# Patient Record
Sex: MALE | Race: OTHER | HISPANIC OR LATINO | Employment: FULL TIME | URBAN - METROPOLITAN AREA
[De-identification: names, ages, dates, MRNs, and addresses within clinical notes are randomized per-mention and may not be internally consistent; named-entity substitution may affect disease eponyms.]

---

## 2021-01-06 ENCOUNTER — HOSPITAL ENCOUNTER (EMERGENCY)
Facility: HOSPITAL | Age: 26
Discharge: HOME | End: 2021-01-06
Attending: EMERGENCY MEDICINE | Admitting: EMERGENCY MEDICINE

## 2021-01-06 VITALS
SYSTOLIC BLOOD PRESSURE: 135 MMHG | TEMPERATURE: 99 F | DIASTOLIC BLOOD PRESSURE: 75 MMHG | HEIGHT: 70 IN | OXYGEN SATURATION: 98 % | BODY MASS INDEX: 23.23 KG/M2 | RESPIRATION RATE: 18 BRPM | WEIGHT: 162.26 LBS | HEART RATE: 88 BPM

## 2021-01-06 DIAGNOSIS — H61.20 CERUMEN IMPACTION: Primary | ICD-10-CM

## 2021-01-06 DIAGNOSIS — H72.90 TYMPANIC MEMBRANE RUPTURE: ICD-10-CM

## 2021-01-06 DIAGNOSIS — R11.0 NAUSEA: ICD-10-CM

## 2021-01-06 PROCEDURE — 69210 REMOVE IMPACTED EAR WAX UNI: CPT | Performed by: PHYSICIAN ASSISTANT

## 2021-01-06 PROCEDURE — 99284 EMERGENCY DEPT VISIT MOD MDM: CPT | Performed by: PHYSICIAN ASSISTANT

## 2021-01-06 PROCEDURE — 99282 EMERGENCY DEPT VISIT SF MDM: CPT

## 2021-01-06 RX ORDER — ONDANSETRON 4 MG/1
4 TABLET, ORALLY DISINTEGRATING ORAL ONCE
Status: COMPLETED | OUTPATIENT
Start: 2021-01-06 | End: 2021-01-06

## 2021-01-06 RX ORDER — MAGNESIUM HYDROXIDE/ALUMINUM HYDROXICE/SIMETHICONE 120; 1200; 1200 MG/30ML; MG/30ML; MG/30ML
30 SUSPENSION ORAL ONCE
Status: COMPLETED | OUTPATIENT
Start: 2021-01-06 | End: 2021-01-06

## 2021-01-06 RX ADMIN — ONDANSETRON 4 MG: 4 TABLET, ORALLY DISINTEGRATING ORAL at 04:19

## 2021-01-06 RX ADMIN — ALUMINUM HYDROXIDE, MAGNESIUM HYDROXIDE, AND SIMETHICONE 30 ML: 200; 200; 20 SUSPENSION ORAL at 04:19

## 2021-01-06 NOTE — ED PROVIDER NOTES
History  Chief Complaint   Patient presents with    Earache     Left ear pain and vomiting     Patient presents for an evaluation of nausea and clogged sensation with decrease in hearing in left ear  Decrease in hearing/ clogging sensation has been ongoing for the last 3 days  Denies any pain to the area or discharge  Denies any fever, chills, headache, dizziness, chest pain, shortness of breath  Has been using OTC ear drops for symptoms with little relief  Patient's nausea started today after drinking coffee  He had 3 episodes of nonbloody nonbilious vomiting without abdominal pain  No urinary symptoms  Normal bowel movements  No other complaints  None       History reviewed  No pertinent past medical history  History reviewed  No pertinent surgical history  History reviewed  No pertinent family history  I have reviewed and agree with the history as documented  E-Cigarette/Vaping     E-Cigarette/Vaping Substances     Social History     Tobacco Use    Smoking status: Never Smoker    Smokeless tobacco: Never Used   Substance Use Topics    Alcohol use: Yes     Frequency: Monthly or less     Drinks per session: 1 or 2     Binge frequency: Less than monthly    Drug use: Never       Review of Systems   Constitutional: Negative for chills and fever  HENT: Positive for hearing loss  Negative for congestion, ear pain and sore throat  Eyes: Negative for pain  Respiratory: Negative for cough and shortness of breath  Cardiovascular: Negative for chest pain  Gastrointestinal: Positive for nausea and vomiting  Negative for abdominal pain  Genitourinary: Negative for dysuria  Musculoskeletal: Negative for back pain  Skin: Negative for rash  Neurological: Negative for dizziness, weakness and numbness  Psychiatric/Behavioral: Negative for suicidal ideas  All other systems reviewed and are negative  Physical Exam  Physical Exam  Vitals signs reviewed     Constitutional: General: He is not in acute distress  Appearance: He is well-developed  He is not diaphoretic  HENT:      Head: Normocephalic and atraumatic  Right Ear: External ear normal  No decreased hearing noted  There is no impacted cerumen  Left Ear: External ear normal  Decreased hearing noted  There is impacted cerumen  Nose: Nose normal    Eyes:      Pupils: Pupils are equal, round, and reactive to light  Neck:      Musculoskeletal: Normal range of motion and neck supple  Cardiovascular:      Rate and Rhythm: Normal rate and regular rhythm  Heart sounds: Normal heart sounds  Pulmonary:      Effort: Pulmonary effort is normal       Breath sounds: Normal breath sounds  Abdominal:      General: Bowel sounds are normal       Palpations: Abdomen is soft  Tenderness: There is no abdominal tenderness  Musculoskeletal: Normal range of motion  Skin:     General: Skin is warm and dry  Neurological:      Mental Status: He is alert and oriented to person, place, and time           Vital Signs  ED Triage Vitals [01/06/21 0408]   Temperature Pulse Respirations Blood Pressure SpO2   99 °F (37 2 °C) 88 18 135/75 98 %      Temp Source Heart Rate Source Patient Position - Orthostatic VS BP Location FiO2 (%)   Oral Monitor Sitting Left arm --      Pain Score       6           Vitals:    01/06/21 0408   BP: 135/75   Pulse: 88   Patient Position - Orthostatic VS: Sitting         Visual Acuity      ED Medications  Medications   ondansetron (ZOFRAN-ODT) dispersible tablet 4 mg (4 mg Oral Given 1/6/21 0419)   aluminum-magnesium hydroxide-simethicone (MYLANTA) oral suspension 30 mL (30 mL Oral Given 1/6/21 0419)       Diagnostic Studies  Results Reviewed     None                 No orders to display              Procedures  Ear cerumen removal    Date/Time: 1/6/2021 5:10 AM  Performed by: Konstantin Duenas PA-C  Authorized by: Konstantin Duenas PA-C   Universal Protocol:  Risks and benefits: risks, benefits and alternatives were discussed  Consent given by: patient  Time out: Immediately prior to procedure a "time out" was called to verify the correct patient, procedure, equipment, support staff and site/side marked as required  Timeout called at: 1/6/2021 5:11 AM   Patient identity confirmed: verbally with patient      Unsuccessful Attempt: unsuccessful attempt    Patient location:  ED  Indications / Diagnosis:  Cerumen impaction  Procedure details:     Location:  L ear    Procedure type: curette    Post-procedure details:     Complication:  TM perforation and bleeding    Hearing quality:  Improved  Comments:      Bleeding noted after using 20 gauge IV catheter after third attempt using mixture of sterile water and hydrogen peroxide with instrumentation  Upon inspection, possible perforated TM with some bleeding  Cerumen still present  Did not further attempt to remove cerumen  ED Course                             SBIRT 20yo+      Most Recent Value   SBIRT (22 yo +)   In order to provide better care to our patients, we are screening all of our patients for alcohol and drug use  Would it be okay to ask you these screening questions? Yes Filed at: 01/06/2021 0416   Initial Alcohol Screen: US AUDIT-C    1  How often do you have a drink containing alcohol? 1 Filed at: 01/06/2021 0416   2  How many drinks containing alcohol do you have on a typical day you are drinking? 1 Filed at: 01/06/2021 0416   3a  Male UNDER 65: How often do you have five or more drinks on one occasion? 1 Filed at: 01/06/2021 0416   Audit-C Score  3 Filed at: 01/06/2021 8628   NELLI: How many times in the past year have you    Used an illegal drug or used a prescription medication for non-medical reasons?   Never Filed at: 01/06/2021 0416                    MDM  Number of Diagnoses or Management Options  Cerumen impaction:   Nausea:   Tympanic membrane rupture:       Disposition  Final diagnoses:   Cerumen impaction   Tympanic membrane rupture   Nausea     Time reflects when diagnosis was documented in both MDM as applicable and the Disposition within this note     Time User Action Codes Description Comment    1/6/2021  4:39 AM Lindsay Chapa Milder Add [H61 20] Cerumen impaction     1/6/2021  4:39 AM Chapa, Lindsay Milder Add [H72 90] Tympanic membrane rupture     1/6/2021  4:40 AM ChapaLindsay matute Milder Add [R11 0] Nausea       ED Disposition     ED Disposition Condition Date/Time Comment    Discharge Stable Wed Jan 6, 2021  4:38 AM Kyle Browne discharge to home/self care  Follow-up Information     Follow up With Specialties Details Why [address],  Otolaryngology   [address]            There are no discharge medications for this patient          PDMP Review     None          ED Provider  Electronically Signed by           María Luna PA-C  01/06/21 0499

## 2021-09-07 ENCOUNTER — TELEPHONE (OUTPATIENT)
Dept: EMERGENCY DEPT | Facility: HOSPITAL | Age: 26
End: 2021-09-07

## 2021-09-07 ENCOUNTER — HOSPITAL ENCOUNTER (EMERGENCY)
Facility: HOSPITAL | Age: 26
Discharge: HOME | End: 2021-09-07
Attending: EMERGENCY MEDICINE
Payer: COMMERCIAL

## 2021-09-07 VITALS
RESPIRATION RATE: 18 BRPM | HEART RATE: 79 BPM | BODY MASS INDEX: 22.96 KG/M2 | WEIGHT: 160 LBS | SYSTOLIC BLOOD PRESSURE: 106 MMHG | DIASTOLIC BLOOD PRESSURE: 75 MMHG | OXYGEN SATURATION: 97 % | TEMPERATURE: 97.2 F

## 2021-09-07 DIAGNOSIS — J06.9 UPPER RESPIRATORY TRACT INFECTION, UNSPECIFIED TYPE: Primary | ICD-10-CM

## 2021-09-07 LAB — SARS-COV-2 RNA RESP QL NAA+PROBE: NEGATIVE

## 2021-09-07 PROCEDURE — U0005 INFEC AGEN DETEC AMPLI PROBE: HCPCS

## 2021-09-07 PROCEDURE — 99282 EMERGENCY DEPT VISIT SF MDM: CPT | Performed by: PHYSICIAN ASSISTANT

## 2021-09-07 PROCEDURE — 99283 EMERGENCY DEPT VISIT LOW MDM: CPT

## 2021-09-07 PROCEDURE — U0003 INFECTIOUS AGENT DETECTION BY NUCLEIC ACID (DNA OR RNA); SEVERE ACUTE RESPIRATORY SYNDROME CORONAVIRUS 2 (SARS-COV-2) (CORONAVIRUS DISEASE [COVID-19]), AMPLIFIED PROBE TECHNIQUE, MAKING USE OF HIGH THROUGHPUT TECHNOLOGIES AS DESCRIBED BY CMS-2020-01-R: HCPCS

## 2021-09-07 NOTE — ED PROVIDER NOTES
History  Chief Complaint   Patient presents with    Sore Throat     today     Pt with cough congestion and sore throat,  Pt with covid-19 vaccination yesterday       Cough  Cough characteristics:  Non-productive  Sputum characteristics:  Nondescript  Severity:  Mild  Duration:  1 day  Timing:  Constant  Progression:  Unchanged  Smoker: no    Context: not animal exposure    Relieved by:  Nothing  Worsened by:  Nothing  Ineffective treatments:  None tried  Associated symptoms: no chest pain    Risk factors: no chemical exposure, no recent infection and no recent travel        None       History reviewed  No pertinent past medical history  History reviewed  No pertinent surgical history  History reviewed  No pertinent family history  I have reviewed and agree with the history as documented  E-Cigarette/Vaping     E-Cigarette/Vaping Substances     Social History     Tobacco Use    Smoking status: Never Smoker    Smokeless tobacco: Never Used   Substance Use Topics    Alcohol use: Yes    Drug use: Never       Review of Systems   Constitutional: Negative  HENT: Negative  Eyes: Negative  Respiratory: Positive for cough  Cardiovascular: Negative  Negative for chest pain  Gastrointestinal: Negative  Endocrine: Negative  Genitourinary: Negative  Musculoskeletal: Negative  Skin: Negative  Allergic/Immunologic: Negative  Neurological: Negative  Hematological: Negative  Psychiatric/Behavioral: Negative  All other systems reviewed and are negative  Physical Exam  Physical Exam  Vitals and nursing note reviewed  Constitutional:       Appearance: Normal appearance  He is normal weight  HENT:      Head: Normocephalic and atraumatic  Right Ear: Tympanic membrane, ear canal and external ear normal       Left Ear: Tympanic membrane, ear canal and external ear normal       Nose: Rhinorrhea present        Comments: Clear       Mouth/Throat:      Mouth: Mucous membranes are moist       Pharynx: Oropharynx is clear  Eyes:      Extraocular Movements: Extraocular movements intact  Conjunctiva/sclera: Conjunctivae normal       Pupils: Pupils are equal, round, and reactive to light  Cardiovascular:      Rate and Rhythm: Normal rate and regular rhythm  Pulses: Normal pulses  Heart sounds: Normal heart sounds  Pulmonary:      Effort: Pulmonary effort is normal       Breath sounds: Normal breath sounds  Abdominal:      General: Abdomen is flat  Bowel sounds are normal       Palpations: Abdomen is soft  Musculoskeletal:         General: Normal range of motion  Cervical back: Normal range of motion and neck supple  Skin:     General: Skin is warm  Capillary Refill: Capillary refill takes less than 2 seconds  Neurological:      General: No focal deficit present  Mental Status: He is alert and oriented to person, place, and time  Psychiatric:         Mood and Affect: Mood normal          Behavior: Behavior normal          Vital Signs  ED Triage Vitals [09/07/21 1054]   Temperature Pulse Respirations Blood Pressure SpO2   (!) 97 2 °F (36 2 °C) 79 18 106/75 97 %      Temp Source Heart Rate Source Patient Position - Orthostatic VS BP Location FiO2 (%)   Tympanic -- -- -- --      Pain Score       --           Vitals:    09/07/21 1054   BP: 106/75   Pulse: 79         Visual Acuity      ED Medications  Medications - No data to display    Diagnostic Studies  Results Reviewed     Procedure Component Value Units Date/Time    Novel Coronavirus [facility] [[id number]]  (Normal) Collected: 09/07/21 1051    Lab Status: Final result Specimen: Nares from Nose Updated: 09/07/21 1219     SARS-CoV-2 Negative    Narrative:       The specimen collection materials, transport medium, and/or testing methodology utilized in the production of these test results have been proven to be reliable in a limited validation with an abbreviated program under the Emergency Utilization Authorization provided by the FDA  Testing reported as "Presumptive positive" will be confirmed with secondary testing to ensure result accuracy  Clinical caution and judgement should be used with the interpretation of these results with consideration of the clinical impression and other laboratory testing  Testing reported as "Positive" or "Negative" has been proven to be accurate according to standard laboratory validation requirements  All testing is performed with control materials showing appropriate reactivity at standard intervals  No orders to display              Procedures  Procedures         ED Course                                           MDM    Disposition  Final diagnoses:   Upper respiratory tract infection, unspecified type     Time reflects when diagnosis was documented in both MDM as applicable and the Disposition within this note     Time User Action Codes Description Comment    9/7/2021 10:38 AM Srinivasan Jara  Add [J06 9] Upper respiratory tract infection, unspecified type       ED Disposition     ED Disposition Condition Date/Time Comment    Discharge Stable Tu Sep 7, 2021 10:38 AM Criss Bethea discharge to home/self care  Follow-up Information     Follow up With Specialties Details Why [address], [address]  [phone]            There are no discharge medications for this patient  No discharge procedures on file      PDMP Review     None          ED Provider  Electronically Signed by           Rosalie Painter PA-C  09/07/21 4244

## 2021-09-07 NOTE — Clinical Note
Berta White was seen and treated in our emergency department on 9/7/2021  Diagnosis:     Margot Laguerre  may return to work on return date  He may return on this date: 09/08/2021         If you have any questions or concerns, please don't hesitate to call        Uli Anguiano RN    ______________________________           _______________          _______________  Hospital Representative                              Date                                Time

## 2021-12-01 ENCOUNTER — APPOINTMENT (EMERGENCY)
Dept: RADIOLOGY | Facility: HOSPITAL | Age: 26
End: 2021-12-01
Payer: COMMERCIAL

## 2021-12-01 ENCOUNTER — HOSPITAL ENCOUNTER (EMERGENCY)
Facility: HOSPITAL | Age: 26
Discharge: HOME | End: 2021-12-01
Attending: EMERGENCY MEDICINE | Admitting: EMERGENCY MEDICINE
Payer: COMMERCIAL

## 2021-12-01 VITALS
BODY MASS INDEX: 23.7 KG/M2 | DIASTOLIC BLOOD PRESSURE: 77 MMHG | TEMPERATURE: 97.2 F | OXYGEN SATURATION: 98 % | HEART RATE: 68 BPM | SYSTOLIC BLOOD PRESSURE: 139 MMHG | RESPIRATION RATE: 16 BRPM | WEIGHT: 165.2 LBS

## 2021-12-01 DIAGNOSIS — S83.006A PATELLAR DISLOCATION: Primary | ICD-10-CM

## 2021-12-01 PROCEDURE — 96372 THER/PROPH/DIAG INJ SC/IM: CPT

## 2021-12-01 PROCEDURE — 99283 EMERGENCY DEPT VISIT LOW MDM: CPT

## 2021-12-01 PROCEDURE — 99284 EMERGENCY DEPT VISIT MOD MDM: CPT | Performed by: EMERGENCY MEDICINE

## 2021-12-01 PROCEDURE — 73564 X-RAY EXAM KNEE 4 OR MORE: CPT

## 2021-12-01 RX ORDER — KETOROLAC TROMETHAMINE 30 MG/ML
15 INJECTION, SOLUTION INTRAMUSCULAR; INTRAVENOUS ONCE
Status: COMPLETED | OUTPATIENT
Start: 2021-12-01 | End: 2021-12-01

## 2021-12-01 RX ORDER — IBUPROFEN 600 MG/1
600 TABLET ORAL EVERY 6 HOURS PRN
Qty: 30 TABLET | Refills: 0 | Status: SHIPPED | OUTPATIENT
Start: 2021-12-01

## 2021-12-01 RX ADMIN — KETOROLAC TROMETHAMINE 15 MG: 30 INJECTION, SOLUTION INTRAMUSCULAR; INTRAVENOUS at 10:21

## 2021-12-09 VITALS
HEART RATE: 78 BPM | SYSTOLIC BLOOD PRESSURE: 117 MMHG | DIASTOLIC BLOOD PRESSURE: 74 MMHG | HEIGHT: 70 IN | BODY MASS INDEX: 23.22 KG/M2 | WEIGHT: 162.2 LBS

## 2021-12-09 DIAGNOSIS — M22.01 RECURRENT DISLOCATION OF PATELLA, RIGHT: Primary | ICD-10-CM

## 2021-12-09 DIAGNOSIS — M25.461 EFFUSION OF RIGHT KNEE: ICD-10-CM

## 2021-12-09 DIAGNOSIS — M25.361 PATELLAR INSTABILITY OF RIGHT KNEE: ICD-10-CM

## 2021-12-09 PROCEDURE — 99204 OFFICE O/P NEW MOD 45 MIN: CPT | Performed by: ORTHOPAEDIC SURGERY

## 2021-12-29 ENCOUNTER — HOSPITAL ENCOUNTER (OUTPATIENT)
Dept: MRI IMAGING | Facility: HOSPITAL | Age: 26
Discharge: HOME | End: 2021-12-29
Payer: COMMERCIAL

## 2021-12-29 DIAGNOSIS — M22.01 RECURRENT DISLOCATION OF PATELLA, RIGHT: ICD-10-CM

## 2021-12-29 DIAGNOSIS — M25.461 EFFUSION OF RIGHT KNEE: ICD-10-CM

## 2021-12-29 PROCEDURE — G1004 CDSM NDSC: HCPCS

## 2021-12-29 PROCEDURE — 73721 MRI JNT OF LWR EXTRE W/O DYE: CPT

## 2022-01-06 VITALS
WEIGHT: 162 LBS | HEIGHT: 69 IN | DIASTOLIC BLOOD PRESSURE: 86 MMHG | SYSTOLIC BLOOD PRESSURE: 135 MMHG | BODY MASS INDEX: 23.99 KG/M2

## 2022-01-06 DIAGNOSIS — M22.01 RECURRENT DISLOCATION OF PATELLA, RIGHT: Primary | ICD-10-CM

## 2022-01-06 DIAGNOSIS — M25.461 EFFUSION OF RIGHT KNEE: ICD-10-CM

## 2022-01-06 DIAGNOSIS — M25.361 PATELLAR INSTABILITY OF RIGHT KNEE: ICD-10-CM

## 2022-01-06 PROCEDURE — 99214 OFFICE O/P EST MOD 30 MIN: CPT | Performed by: ORTHOPAEDIC SURGERY

## 2022-01-06 NOTE — PROGRESS NOTES
Ortho Sports Medicine Knee Visit     Assesment:   23year old Male Right knee recurrent patellar dislocations     Plan:    Conservative treatment:    Ice to knee for 20 minutes at least 1-2 times daily  PT written  OTC NSAIDS prn for pain  Continue with J-Brace    Imaging: All imaging from today was reviewed by myself and explained to the patient  Injection:    No Injection planned at this time  Surgery:     I instructed that because the patient has had 2 patellar dislocations and has continued patellar instability daily that I would recommend MPFL reconstruction  Instructed that after 2 patellar dislocations it is unlikely that conservative treatment would scar in the MPFL to provide him with patellar instability  Also instructed that the risk of further damage within the knee with another patellar dislocation is likely  Instructed that patient currently does not have any cartilage damage to the patella or femur at this time  Patient declined  Patient would like to return to work and continue with bracing and do physical therapy  Patient reports if these conservative treatments do not provide him with improvement that he will consider surgical intervention  All risks and benefits of surgery were explained to patient and all of his questions were answered  He will defer the surgery until 1 more trial of physical therapy given that he has not had a dislocation in 8 year's until now  Follow-up: 6 weeks, if no patellar stability improvement consider surgery for MPFL reconstruction       History of Present Illness: The patient is returns for follow up of right knee MRI for recurrent patellar dislocations  Since the prior visit, He reports minimal improvement  Patient presents to the office with his J brace on  Patient reports that he continues to feel as though the patella is unstable  He states that he fell shifting daily    Patient is never performed physical therapy and would like to give this a try prior to considering surgery  Pain is located anterior and lateral     Pain is improved by rest   Pain is aggravated by stairs, squatting, weight bearing, walking and pivoting on a planted foot  Symptoms include clicking, catching, swelling and patellar insatbility  The patient has tried rest, ice, NSAIDS and bracing  I have reviewed the past medical, surgical, social and family history, medications and allergies as documented in the EMR  Review of systems: ROS is negative other than that noted in the HPI  Constitutional: Negative for fatigue and fever  Cardiovascular: Negative for chest pain  Pulmonary: negative for shortness of breath    PMH/PSH:  History reviewed  No pertinent past medical history  History reviewed  No pertinent surgical history  Physical Exam:    Blood pressure 135/86, height 5' 9" (1 753 m), weight 73 5 kg (162 lb)  General/Constitutional: NAD, well developed, well nourished  HENT: Normocephalic, atraumatic  CV: Intact distal pulses, regular rate  Resp: No respiratory distress or labored breathing  Lymphatic: No lymphadenopathy palpated  Neuro: Alert and Oriented x 3, no focal deficits  Psych: Normal mood, normal affect, normal judgement, normal behavior  Skin: Warm, dry, no rashes, no erythema       Knee Exam (focused):                   RIGHT LEFT   ROM:   0-130 0-130   Palpation: Effusion negative negative     MJL tenderness Negative Negative     LJL tenderness Negative Negative   Instability: Varus stable stable     Valgus stable stable   Special Tests: Lachman Negative Negative     Posterior drawer Negative Negative     Anterior drawer Negative Negative     Pivot shift not tested not tested     Dial not tested not tested   Patella: Palpation medial facet ttp no tenderness     Mobility 3/4 1/4     Apprehension Positive Negative   Other: Single leg 1/4 squat not tested not tested      LE NV Exam: +2 DP/PT pulses bilaterally  Sensation intact to light touch L2-S1 bilaterally    No calf tenderness to palpation bilaterally      Knee Imaging    MRI of the right knee were reviewed, which demonstrate bone contusion pattern on the medial aspect of the patella and lateral femoral condyle consistent with lateral patellar dislocation  Cartilage is intact without defect  TT-TG measured 13mm  I have reviewed the radiology report and agree with their impression

## 2022-01-06 NOTE — LETTER
January 6, 2022     Patient: Patrick Cotter [facility]   YOB: 1995   Date of Visit: 1/6/2022       To Whom it May Concern:    Renay Feliciano is under my professional care  He was seen in my office on 1/6/2022  He may return to work on 1/10/22  If you have any questions or concerns, please don't hesitate to call  Sincerely,          Mac Ho DO        CC: Haris Bobby

## 2022-01-13 ENCOUNTER — EVALUATION (OUTPATIENT)
Dept: PHYSICAL THERAPY | Facility: MEDICAL CENTER | Age: 27
End: 2022-01-13
Payer: COMMERCIAL

## 2022-01-13 DIAGNOSIS — M25.361 PATELLAR INSTABILITY OF RIGHT KNEE: ICD-10-CM

## 2022-01-13 DIAGNOSIS — M22.01 RECURRENT DISLOCATION OF PATELLA, RIGHT: Primary | ICD-10-CM

## 2022-01-13 PROCEDURE — 97161 PT EVAL LOW COMPLEX 20 MIN: CPT | Performed by: PHYSICAL THERAPIST

## 2022-01-13 PROCEDURE — 97110 THERAPEUTIC EXERCISES: CPT | Performed by: PHYSICAL THERAPIST

## 2022-01-13 NOTE — PROGRESS NOTES
PT Evaluation     Today's date: 2022  Patient name: Esthela Bobby  : 1995  MRN: [id number]  Referring provider: George Sanz,*  Dx:   Encounter Diagnoses   Name Primary?  Recurrent dislocation of patella, right     Patellar instability of right knee                   Assessment  Assessment details: Mark Galan is a 22 y/o male who presents with complaints of acute R knee pain  The patient's greatest concern is fear of future patellar dislocation  Primary movement impairment diagnosis of R patellar hypermobility, resulting in pathoanatomical symptoms of recurrent dislocation of patellar and right patellar instability, which limits his ability to perform functional activities without pain  Pt  will benefit from skilled PT services that includes manual therapy techniques to enhance tissue extensibility, neuromuscular re-education to facilitate motor control, therapeutic exercise to increase functional mobility, and modalities prn to reduce pain and inflammation    Impairments: abnormal muscle firing, activity intolerance, impaired balance, impaired physical strength, lacks appropriate home exercise program and pain with function  Barriers to therapy: Recurrent patellar dislocation  Understanding of Dx/Px/POC: good   Prognosis: fair    Goals  Impairment Goals  - Pt I with initial HEP in 1-2 visits  - Increase strength to 5/5 in all affected areas in 4-6 weeks    Functional Goals  - Increase Functional Status Measure to: 76 in 6-8 weeks  - Patient will be independent with comprehensive HEP in 6-8 weeks  - Ambulation is improved to prior level of function in 6-8 weeks  - Stair climbing is improved to prior level of function in 6-8 weeks  - Squatting is improved to prior level of function in 6-8 weeks    Plan  Patient would benefit from: PT eval  Planned modality interventions: thermotherapy: hydrocollator packs and cryotherapy  Planned therapy interventions: manual therapy, neuromuscular re-education, strengthening, stretching, therapeutic exercise, home exercise program, graded exercise, graded activity, gait training, body mechanics training and balance/weight bearing training  Frequency: 2x week  Duration in weeks: 8  Treatment plan discussed with: patient      Subjective Evaluation    History of Present Illness  Mechanism of injury: Patient states that on 2021, he was working and dislocated his patella laterally while walking  Patient was able to relocate the patella himself and went to the ED  He then followed up c/ Dr Faustino Montes De Oca and had an Xray and MRI  There is no OCD lesion noted  Patient notes that this happened 8 years ago and he hasn't had an experience like this since then  Patient denies R LE paresthesias  He reports no pain at rest   He has been compliant c/ J brace  Patient would like to try physical therapy before agreeing to MPFL reconstruction surgery  Pain  Current pain ratin  At best pain ratin  At worst pain rating: 10  Location: R knee  Quality: tight      Diagnostic Tests  X-ray: normal  Abnormal MRI: Bone contusion pattern consistent with recent transient lateral patellar dislocation  Treatments  Current treatment: physical therapy  Patient Goals  Patient goals for therapy: increased strength, independence with ADLs/IADLs and decreased pain        Objective     Observations     Right Knee   Negative for deformity and effusion  Additional Observation Details  +2 DP/PT pulses bilaterally  Tenderness     Right Knee   Tenderness in the ITB and medial patella  No tenderness in the patellar tendon  Neurological Testing     Sensation     Knee   Left Knee   Intact: light touch    Right Knee   Intact: light touch     Additional Neurological Details  Reflexes NT      Active Range of Motion   Left Knee   Normal active range of motion    Right Knee   Normal active range of motion    Passive Range of Motion   Left Hip   Normal passive range of motion    Right Hip   Normal passive range of motion    Mobility   Patellar Mobility:   Left Knee   Hypermobile: left medial, left lateral, left superior and left inferior      Right Knee   Hypermobile: medial, superior and inferior   Tibiofemoral Mobility:   Left knee   Tibiofemoral tendons within functional limits include the anterior and posterior  Right knee Tibiofemoral tendons within functional limits include the anterior and posterior  Patellar Static Positioning   Left Knee: WFL  Right Knee: lateral tilt    Strength/Myotome Testing     Left Hip   Planes of Motion   Flexion: 5  Extension: 5  Abduction: 5  Adduction: 5    Isolated Muscles   Gluteus jayna: 5  Gluteus medius: 5  Iliopsoas: 5    Right Hip   Planes of Motion   Flexion: 4-  Extension: 4-  Abduction: 4-  Adduction: 4-    Isolated Muscles   Gluteus maximums: 4-  Gluteus medius: 4-  Iliopsoas: 4-    Left Knee   Flexion: 5  Extension: 5    Right Knee   Flexion: 4-  Extension: 4  Quadriceps contraction: fair    Tests     Right Knee   Positive patellar apprehension  Negative valgus stress test at 0 degrees, valgus stress test at 30 degrees, varus stress test at 0 degrees and varus stress test at 30 degrees  Functional Assessment      Squat    Pain  Single Leg Stance   Left single leg stance time: 10s  Right single leg stance time: 10s c/ R knee valgus      Posterior weight shift: moderate    Depth of femur height: above 90 degrees       Precautions:  Recurrent patellar dislocations; WBAT    Manuals 1/13                                                                Neuro Re-Ed                                                    Ther Ex             Bike             SLRsx4 2x10                         Hip add 20x5s            Bridges 2x10 5s                                                   Ther Activity                                       Gait Training                                       Modalities Titi Vanessa, PT  1/13/2022,9:42 AM

## 2022-01-19 ENCOUNTER — OFFICE VISIT (OUTPATIENT)
Dept: PHYSICAL THERAPY | Facility: MEDICAL CENTER | Age: 27
End: 2022-01-19
Payer: COMMERCIAL

## 2022-01-19 DIAGNOSIS — M25.361 PATELLAR INSTABILITY OF RIGHT KNEE: ICD-10-CM

## 2022-01-19 DIAGNOSIS — M22.01 RECURRENT DISLOCATION OF PATELLA, RIGHT: Primary | ICD-10-CM

## 2022-01-19 PROCEDURE — 97110 THERAPEUTIC EXERCISES: CPT | Performed by: PHYSICAL THERAPIST

## 2022-01-19 NOTE — PROGRESS NOTES
Daily Note     Today's date: 2022  Patient name: Vipul Bobby  : 1995  MRN: [id number]  Referring provider: Chaz Escobar,*  Dx:   Encounter Diagnosis     ICD-10-CM    1  Recurrent dislocation of patella, right  M22 01    2  Patellar instability of right knee  M25 361                   Subjective:  Patient states that he is doing well  Objective: See treatment diary below  Assessment:  Patient was unable to make it to last tx session d/t unknown reason  Patient demonstrates hip accessory weakness and fatigue post exercises  He demonstrates dynamic R hip IR c/ leg raises  Patient demonstrates R SL stability deficits and R foot pronation  Tolerated treatment well  Patient would benefit from continued PT  Plan: Continue per plan of care        Precautions:  Recurrent patellar dislocations; WBAT    Manuals                                                                Neuro Re-Ed                                                    Ther Ex             Bike  10'           SLRsx4 2x10 3x10                        Hip add 20x5s 20x5s           Bridges 2x10 5s 2x10 5s           Clams  30x5s                        SL balance  alph 2x           SL squat to wall             Banded midfoot mini squats             LP             Ther Activity                                       Gait Training                                       Modalities

## 2022-01-24 ENCOUNTER — OFFICE VISIT (OUTPATIENT)
Dept: PHYSICAL THERAPY | Facility: MEDICAL CENTER | Age: 27
End: 2022-01-24
Payer: COMMERCIAL

## 2022-01-24 DIAGNOSIS — M22.01 RECURRENT DISLOCATION OF PATELLA, RIGHT: Primary | ICD-10-CM

## 2022-01-24 DIAGNOSIS — M25.361 PATELLAR INSTABILITY OF RIGHT KNEE: ICD-10-CM

## 2022-01-24 PROCEDURE — 97110 THERAPEUTIC EXERCISES: CPT | Performed by: PHYSICAL THERAPIST

## 2022-01-24 NOTE — PROGRESS NOTES
Daily Note     Today's date: 2022  Patient name: Marito Bobby  : 1995  MRN: [id number]  Referring provider: Joshua Santoro,*  Dx:   Encounter Diagnosis     ICD-10-CM    1  Recurrent dislocation of patella, right  M22 01    2  Patellar instability of right knee  M25 361                   Subjective:  Patient states that he feels good  Objective: See treatment diary below  Assessment:  Patient demonstrates good tolerance to exercise progressions  He has no pain c/ single leg squats to the wall, but demonstrates R knee valgus  Verbal cues to enhance form throughout  Tolerated treatment well  Patient would benefit from continued PT  Plan: Continue per plan of care        Precautions:  Recurrent patellar dislocations; WBAT    Manuals                                                               Neuro Re-Ed                                                    Ther Ex             Bike  10' 10'          SLRsx4 2x10 3x10 3x10 2#                       Hip add 20x5s 20x5s 20x5s          Bridges 2x10 5s 2x10 5s 20x5s GR          Clams  30x5s 30x5s GR                       SL balance  alph 2x alph 3x          SL squat to wall   2x10          Banded midfoot mini squats             LP             Ther Activity                                       Gait Training                                       Modalities

## 2022-01-31 ENCOUNTER — HOSPITAL ENCOUNTER (EMERGENCY)
Facility: HOSPITAL | Age: 27
Discharge: HOME | End: 2022-01-31
Attending: EMERGENCY MEDICINE | Admitting: EMERGENCY MEDICINE
Payer: COMMERCIAL

## 2022-01-31 ENCOUNTER — APPOINTMENT (OUTPATIENT)
Dept: PHYSICAL THERAPY | Facility: MEDICAL CENTER | Age: 27
End: 2022-01-31
Payer: COMMERCIAL

## 2022-01-31 VITALS
OXYGEN SATURATION: 99 % | BODY MASS INDEX: 23.75 KG/M2 | RESPIRATION RATE: 16 BRPM | HEART RATE: 102 BPM | WEIGHT: 160.8 LBS | DIASTOLIC BLOOD PRESSURE: 66 MMHG | TEMPERATURE: 100.2 F | SYSTOLIC BLOOD PRESSURE: 123 MMHG

## 2022-01-31 DIAGNOSIS — Z20.822 ENCOUNTER FOR LABORATORY TESTING FOR COVID-19 VIRUS: Primary | ICD-10-CM

## 2022-01-31 DIAGNOSIS — J02.9 PHARYNGITIS: ICD-10-CM

## 2022-01-31 LAB — S PYO DNA THROAT QL NAA+PROBE: NORMAL

## 2022-01-31 PROCEDURE — 99283 EMERGENCY DEPT VISIT LOW MDM: CPT

## 2022-01-31 PROCEDURE — U0003 INFECTIOUS AGENT DETECTION BY NUCLEIC ACID (DNA OR RNA); SEVERE ACUTE RESPIRATORY SYNDROME CORONAVIRUS 2 (SARS-COV-2) (CORONAVIRUS DISEASE [COVID-19]), AMPLIFIED PROBE TECHNIQUE, MAKING USE OF HIGH THROUGHPUT TECHNOLOGIES AS DESCRIBED BY CMS-2020-01-R: HCPCS | Performed by: PHYSICIAN ASSISTANT

## 2022-01-31 PROCEDURE — 87651 STREP A DNA AMP PROBE: CPT | Performed by: PHYSICIAN ASSISTANT

## 2022-01-31 PROCEDURE — 99284 EMERGENCY DEPT VISIT MOD MDM: CPT | Performed by: PHYSICIAN ASSISTANT

## 2022-01-31 PROCEDURE — U0005 INFEC AGEN DETEC AMPLI PROBE: HCPCS | Performed by: PHYSICIAN ASSISTANT

## 2022-01-31 RX ORDER — AMOXICILLIN 500 MG/1
500 CAPSULE ORAL EVERY 8 HOURS SCHEDULED
Qty: 30 CAPSULE | Refills: 0 | Status: SHIPPED | OUTPATIENT
Start: 2022-01-31 | End: 2022-02-10

## 2022-01-31 NOTE — ED PROVIDER NOTES
History  Chief Complaint   Patient presents with    Cold Like Symptoms     cough, sore throat and fever with bodyaches since yesterday  Pt with sore throat and nasal congestion  Pt with covid vaccination       Sore Throat  Location:  Generalized  Severity:  Mild  Onset quality:  Gradual  Duration:  2 days  Timing:  Constant  Progression:  Worsening  Chronicity:  New  Relieved by:  Nothing  Worsened by:  Nothing  Ineffective treatments:  None tried  Associated symptoms: rhinorrhea    Risk factors: no exposure to strep        Prior to Admission Medications   Prescriptions Last Dose Informant Patient Reported? Taking?   ibuprofen (MOTRIN) 600 mg tablet   No No   Sig: Take 1 tablet (600 mg total) by mouth every 6 (six) hours as needed for mild pain   Patient not taking: Reported on 1/13/2022       Facility-Administered Medications: None       History reviewed  No pertinent past medical history  History reviewed  No pertinent surgical history  History reviewed  No pertinent family history  I have reviewed and agree with the history as documented  E-Cigarette/Vaping    E-Cigarette Use Never User      E-Cigarette/Vaping Substances     Social History     Tobacco Use    Smoking status: Never Smoker    Smokeless tobacco: Never Used   Vaping Use    Vaping Use: Never used   Substance Use Topics    Alcohol use: Yes    Drug use: Never       Review of Systems   Constitutional: Negative  HENT: Positive for congestion, rhinorrhea and sore throat  Eyes: Negative  Respiratory: Negative  Cardiovascular: Negative  Gastrointestinal: Negative  Endocrine: Negative  Genitourinary: Negative  Musculoskeletal: Positive for myalgias  Skin: Negative  Allergic/Immunologic: Negative  Neurological: Negative  Hematological: Negative  Psychiatric/Behavioral: Negative  All other systems reviewed and are negative  Physical Exam  Physical Exam  Vitals and nursing note reviewed  Constitutional:       Appearance: Normal appearance  He is normal weight  HENT:      Head: Normocephalic and atraumatic  Right Ear: Tympanic membrane, ear canal and external ear normal       Left Ear: Tympanic membrane, ear canal and external ear normal       Nose: Congestion and rhinorrhea present  Mouth/Throat:      Pharynx: Posterior oropharyngeal erythema present  Eyes:      Extraocular Movements: Extraocular movements intact  Conjunctiva/sclera: Conjunctivae normal       Pupils: Pupils are equal, round, and reactive to light  Cardiovascular:      Rate and Rhythm: Normal rate and regular rhythm  Pulses: Normal pulses  Heart sounds: Normal heart sounds  Pulmonary:      Effort: Pulmonary effort is normal       Breath sounds: Normal breath sounds  Abdominal:      General: Abdomen is flat  Bowel sounds are normal       Palpations: Abdomen is soft  Musculoskeletal:         General: Normal range of motion  Cervical back: Normal range of motion and neck supple  Lymphadenopathy:      Cervical: Cervical adenopathy present  Skin:     General: Skin is warm  Capillary Refill: Capillary refill takes less than 2 seconds  Neurological:      General: No focal deficit present  Mental Status: He is alert and oriented to person, place, and time     Psychiatric:         Mood and Affect: Mood normal          Behavior: Behavior normal          Vital Signs  ED Triage Vitals [01/31/22 1712]   Temperature Pulse Respirations Blood Pressure SpO2   100 2 °F (37 9 °C) 102 16 123/66 99 %      Temp Source Heart Rate Source Patient Position - Orthostatic VS BP Location FiO2 (%)   Oral Monitor Sitting Left arm --      Pain Score       8           Vitals:    01/31/22 1712   BP: 123/66   Pulse: 102   Patient Position - Orthostatic VS: Sitting         Visual Acuity      ED Medications  Medications - No data to display    Diagnostic Studies  Results Reviewed     Procedure Component Value Units Date/Time    COVID only [[id number]] Collected: 01/31/22 1723    Lab Status: In process Specimen: Nares from Nose Updated: 01/31/22 1738    Strep A PCR [[id number]] Collected: 01/31/22 1723    Lab Status: In process Specimen: Throat Updated: 01/31/22 1728                 No orders to display              Procedures  Procedures         ED Course                               SBIRT 22yo+      Most Recent Value   SBIRT (23 yo +)    In order to provide better care to our patients, we are screening all of our patients for alcohol and drug use  Would it be okay to ask you these screening questions? No Filed at: 01/31/2022 1716                    MDM    Disposition  Final diagnoses:   Encounter for laboratory testing for COVID-19 virus   Pharyngitis     Time reflects when diagnosis was documented in both MDM as applicable and the Disposition within this note     Time User Action Codes Description Comment    1/31/2022  5:25 PM Zoya Dancer  Add [J02 9] Pharyngitis, unspecified etiology     1/31/2022  5:26 PM Zoya Dancer  Add [Z20 822] Encounter for laboratory testing for COVID-19 virus     1/31/2022  5:27 PM Isamar Willis Gaunt  Add [M22 01] Recurrent dislocation of patella, right     1/31/2022  5:27 PM Zoya Dancer  Modify [Z20 822] Encounter for laboratory testing for COVID-19 virus     1/31/2022  5:27 PM Isamar Willis Gaunt  Remove [J02 9] Pharyngitis, unspecified etiology     1/31/2022  5:27 PM Zoya Dancer  Remove [M22 01] Recurrent dislocation of patella, right     1/31/2022  5:28 PM Bina Guerreroa Gaunt  Add [J02 9] Pharyngitis       ED Disposition     ED Disposition Condition Date/Time Comment    Discharge Stable Mon Jan 31, 2022  5:26 PM Ofe Bobby discharge to home/self care              Follow-up Information     Follow up With Specialties Details Why [address], [address]  [phone]            Discharge Medication List as of 1/31/2022  5:28 PM      START taking these medications    Details   amoxicillin (AMOXIL) 500 mg capsule Take 1 capsule (500 mg total) by mouth every 8 (eight) hours for 10 days, Starting Mon 1/31/2022, Until Thu 2/10/2022, Print         CONTINUE these medications which have NOT CHANGED    Details   ibuprofen (MOTRIN) 600 mg tablet Take 1 tablet (600 mg total) by mouth every 6 (six) hours as needed for mild pain, Starting Wed 12/1/2021, Print             No discharge procedures on file      PDMP Review     None          ED Provider  Electronically Signed by           Virgilio Shaikh PA-C  01/31/22 0501

## 2022-01-31 NOTE — DISCHARGE INSTRUCTIONS

## 2022-02-01 LAB — SARS-COV-2 RNA RESP QL NAA+PROBE: POSITIVE

## 2022-02-02 NOTE — RESULT ENCOUNTER NOTE
Patient called at [phone]  Name and  used as positive patient identifiers  Made aware of positive test results  Discussed supportive care and quarantine as well as return to ER precautions

## 2024-05-31 ENCOUNTER — HOSPITAL ENCOUNTER (EMERGENCY)
Facility: HOSPITAL | Age: 29
Discharge: HOME | End: 2024-05-31
Attending: EMERGENCY MEDICINE

## 2024-05-31 VITALS
TEMPERATURE: 98 F | OXYGEN SATURATION: 98 % | RESPIRATION RATE: 14 BRPM | HEART RATE: 63 BPM | DIASTOLIC BLOOD PRESSURE: 73 MMHG | SYSTOLIC BLOOD PRESSURE: 130 MMHG

## 2024-05-31 DIAGNOSIS — H61.21 IMPACTED CERUMEN OF RIGHT EAR: Primary | ICD-10-CM

## 2024-05-31 PROCEDURE — 99282 EMERGENCY DEPT VISIT SF MDM: CPT

## 2024-05-31 PROCEDURE — 99283 EMERGENCY DEPT VISIT LOW MDM: CPT | Performed by: EMERGENCY MEDICINE

## 2024-05-31 NOTE — ED PROVIDER NOTES
History  Chief Complaint   Patient presents with    Earache     Patient reports a clogged right ear.  Denies foreign body.       HPI  Patient is a 28-year-old male presenting with right ear discomfort.  Denies any pain.  States that he has been having difficulty hearing from the right ear.  Has had cerumen impaction in the past and states that it similar to his cerumen impaction symptoms.  Patient uses Q-tips regularly.  Denies any pain behind the ear.  Reports no fever, chills, nausea, vomiting, diarrhea.    Prior to Admission Medications   Prescriptions Last Dose Informant Patient Reported? Taking?   ibuprofen (MOTRIN) 600 mg tablet   No No   Sig: Take 1 tablet (600 mg total) by mouth every 6 (six) hours as needed for mild pain   Patient not taking: Reported on 1/13/2022       Facility-Administered Medications: None       No past medical history on file.    No past surgical history on file.    No family history on file.  I have reviewed and agree with the history as documented.    E-Cigarette/Vaping    E-Cigarette Use Never User      E-Cigarette/Vaping Substances     Social History     Tobacco Use    Smoking status: Never    Smokeless tobacco: Never   Vaping Use    Vaping status: Never Used   Substance Use Topics    Alcohol use: Yes    Drug use: Never       Review of Systems   Constitutional:  Negative for chills, diaphoresis, fever and unexpected weight change.   HENT:  Positive for hearing loss. Negative for ear pain and sore throat.    Eyes:  Negative for visual disturbance.   Respiratory:  Negative for cough, chest tightness and shortness of breath.    Cardiovascular:  Negative for chest pain and leg swelling.   Gastrointestinal:  Negative for abdominal distention, abdominal pain, constipation, diarrhea, nausea and vomiting.   Endocrine: Negative.    Genitourinary:  Negative for difficulty urinating and dysuria.   Musculoskeletal: Negative.    Skin: Negative.    Allergic/Immunologic: Negative.    Neurological:  Negative.    Hematological: Negative.    Psychiatric/Behavioral: Negative.     All other systems reviewed and are negative.      Physical Exam  Physical Exam  Vitals and nursing note reviewed.   Constitutional:       General: He is not in acute distress.     Appearance: Normal appearance. He is not ill-appearing.   HENT:      Head: Normocephalic and atraumatic.      Right Ear: External ear normal. There is impacted cerumen.      Left Ear: External ear normal.      Nose: Nose normal.      Mouth/Throat:      Mouth: Mucous membranes are moist.      Pharynx: Oropharynx is clear.   Eyes:      General: No scleral icterus.        Right eye: No discharge.         Left eye: No discharge.      Extraocular Movements: Extraocular movements intact.      Conjunctiva/sclera: Conjunctivae normal.      Pupils: Pupils are equal, round, and reactive to light.   Cardiovascular:      Rate and Rhythm: Normal rate and regular rhythm.      Pulses: Normal pulses.      Heart sounds: Normal heart sounds.   Pulmonary:      Effort: Pulmonary effort is normal.      Breath sounds: Normal breath sounds.   Abdominal:      General: Abdomen is flat. Bowel sounds are normal. There is no distension.      Palpations: Abdomen is soft.      Tenderness: There is no abdominal tenderness. There is no guarding or rebound.   Musculoskeletal:         General: Normal range of motion.      Cervical back: Normal range of motion and neck supple.   Skin:     General: Skin is warm and dry.      Capillary Refill: Capillary refill takes less than 2 seconds.   Neurological:      General: No focal deficit present.      Mental Status: He is alert and oriented to person, place, and time. Mental status is at baseline.   Psychiatric:         Mood and Affect: Mood normal.         Behavior: Behavior normal.         Thought Content: Thought content normal.         Judgment: Judgment normal.         Vital Signs  ED Triage Vitals [05/31/24 1635]   Temperature Pulse Respirations  Blood Pressure SpO2   98 °F (36.7 °C) 63 14 130/73 98 %      Temp Source Heart Rate Source Patient Position - Orthostatic VS BP Location FiO2 (%)   Oral -- Sitting Right arm --      Pain Score       --           Vitals:    05/31/24 1635   BP: 130/73   Pulse: 63   Patient Position - Orthostatic VS: Sitting         Visual Acuity      ED Medications  Medications - No data to display    Diagnostic Studies  Results Reviewed       None                   No orders to display              Procedures  Procedures         ED Course                                             Medical Decision Making  28-year-old male presents with ear discomfort and hearing deficit  On exam patient has significant cerumen impaction on the right.  Will plan to discharge patient with proximal  Also instructed to follow-up with ENT if symptoms does not resolve.    Problems Addressed:  Impacted cerumen of right ear: acute illness or injury             Disposition  Final diagnoses:   Impacted cerumen of right ear     Time reflects when diagnosis was documented in both MDM as applicable and the Disposition within this note       Time User Action Codes Description Comment    5/31/2024  5:17 PM Tr Waddell Add [H61.21] Impacted cerumen of right ear           ED Disposition       ED Disposition   Discharge    Condition   Stable    Date/Time   Fri May 31, 2024  5:17 PM    Comment   Haris Bobby discharge to home/self care.                   Follow-up Information       Follow up With Specialties Details Why Contact Info Additional Information    [facility] Schedule an appointment as soon as possible for a visit   [address] [facility] [address] [phone] [facility], [address] [place], [place], [address] [phone]            Patient's Medications   Discharge Prescriptions    CARBAMIDE PEROXIDE (DEBROX) 6.5 % OTIC SOLUTION     Administer 5 drops into ears 2 (two) times a day       Start Date: 5/31/2024 End Date: --       Order Dose: 5 drops       Quantity: 15 mL    Refills: 0       No discharge procedures on file.    PDMP Review       None            ED Provider  Electronically Signed by             Tr Waddell MD  05/31/24 1998

## 2024-06-12 ENCOUNTER — OFFICE VISIT (OUTPATIENT)
Dept: DENTISTRY | Facility: CLINIC | Age: 29
End: 2024-06-12

## 2024-06-12 VITALS — SYSTOLIC BLOOD PRESSURE: 113 MMHG | HEART RATE: 76 BPM | DIASTOLIC BLOOD PRESSURE: 73 MMHG | TEMPERATURE: 99.1 F

## 2024-06-12 DIAGNOSIS — Z01.20 ENCOUNTER FOR DENTAL EXAMINATION: Primary | ICD-10-CM

## 2024-06-12 PROCEDURE — D0330 PANORAMIC RADIOGRAPHIC IMAGE: HCPCS

## 2024-06-12 PROCEDURE — D0140 LIMITED ORAL EVALUATION - PROBLEM FOCUSED: HCPCS

## 2024-06-12 NOTE — DENTAL PROCEDURE DETAILS
Limited Exam    Haris Bobby 29 y.o. male presents with self to [facility] for Limited exam  PMH reviewed, no changes, ASA I. Significant medical history: none.     Chief complaint: I have a hole in my wisdom tooth    Consent:  Discussed that limited exam focuses on problem area, and same day tx is not guaranteed.  Patient explained to if they wish to have anything else evaluated, they need to return to the practice at which they are a patient of record or receive a comprehensive exam.  Patient understands and consents.    Subjective history:    Onset: a few weeks   Provocation: Chewing   Quality: Sharp   Region: Patient points to tooth #1   Severity: 7/10   Timing: comes and goes    Objective clinical findings:   Oral cancer screening: No abnormalities detected   Extraoral exam:   - Facial Swelling-No Lymphadenopathy-No TMD symptoms-No  Intraoral exam:  - Caries-Yes, tooth #1 Fistula-No Abscess-No Pus-No Gingival inflammation-generalized   Missing tooth #14 with mesial drift of #15,16    Radiographs: PAN      Assessment:  #1 Irreversible Pulpitis ; Symptomatic apical periodontitis    Plan:   #1 non restorable due to decay, extract  Also extract #32 due to no opposing tooth, and extract #17 due to mesioangular impaction pushing on #18  #16 can stay as it is full erupted and in occlusion with #18 due to mesial drift    [place] [facility] [place]17,[place]    Comprehensive care disposition:  Patient expressed interest in becoming a patient of record with one of the  dental clinics.    Advised pt to take tylenol (max 3000mg/day) and ibuprofen (max 3200mg/day) OTC prn for pain and to call us if any swelling or pus is seen or if pain increases  Gave pt referral, list of providers and copy of panoramic    Patient dismissed ambulatory and alert.    NV: comp exam after extractions at [facility]     Attending Red